# Patient Record
Sex: FEMALE | Race: WHITE | Employment: FULL TIME | URBAN - METROPOLITAN AREA
[De-identification: names, ages, dates, MRNs, and addresses within clinical notes are randomized per-mention and may not be internally consistent; named-entity substitution may affect disease eponyms.]

---

## 2018-06-26 ENCOUNTER — TELEPHONE (OUTPATIENT)
Dept: CARDIOLOGY CLINIC | Age: 63
End: 2018-06-26

## 2018-06-26 NOTE — TELEPHONE ENCOUNTER
Faxed records request to Dr. Kathy Padilla office.     Future Appointments  Date Time Provider Elaine Dodd   6/27/2018 2:40 PM Leif Murillo  [address]

## 2018-06-27 ENCOUNTER — OFFICE VISIT (OUTPATIENT)
Dept: CARDIOLOGY CLINIC | Age: 63
End: 2018-06-27

## 2018-06-27 VITALS
DIASTOLIC BLOOD PRESSURE: 80 MMHG | SYSTOLIC BLOOD PRESSURE: 122 MMHG | WEIGHT: 176 LBS | HEIGHT: 62 IN | OXYGEN SATURATION: 96 % | HEART RATE: 81 BPM | RESPIRATION RATE: 18 BRPM | BODY MASS INDEX: 32.39 KG/M2

## 2018-06-27 DIAGNOSIS — R00.0 TACHYCARDIA: ICD-10-CM

## 2018-06-27 DIAGNOSIS — R94.31 ABNORMAL EKG: Primary | ICD-10-CM

## 2018-06-27 DIAGNOSIS — R00.2 PALPITATIONS: ICD-10-CM

## 2018-06-27 DIAGNOSIS — I49.3 PVC (PREMATURE VENTRICULAR CONTRACTION): ICD-10-CM

## 2018-06-27 NOTE — MR AVS SNAPSHOT
[address] [facility] [address] [facility] 
[phone] Patient: Rafia Moody MRN: [id number] Visit Information Date & Time Provider Department Dept. Phone Encounter #  
 6/27/2018  2:40 PM Rosaline King MD [facility] Sheryle Pesa [phone] [id number] Follow-up Instructions Return in about 4 weeks (around 7/25/2018). Upcoming Health Maintenance Date Due Hepatitis C Screening 1955 Pneumococcal 19-64 Highest Risk (1 of 3 - PCV13) 11/21/1974 PAP AKA CERVICAL CYTOLOGY 11/21/1976 BREAST CANCER SCRN MAMMOGRAM 11/21/2005 ZOSTER VACCINE AGE 60> 9/21/2015 Influenza Age 5 to Adult 8/1/2018 COLONOSCOPY 6/18/2019 DTaP/Tdap/Td series (2 - Td) 2/2/2026 Allergies as of 6/27/2018  Review Complete On: 6/27/2018 By: Rosaline King MD  
  
 Severity Noted Reaction Type Reactions Bee Sting [Sting, Bee]  05/06/2010    Hives, Swelling Current Immunizations  Never Reviewed Name Date Tdap 2/2/2016 11:50 AM  
  
 Not reviewed this visit Vitals BP Pulse Resp Height(growth percentile) Weight(growth percentile) SpO2  
 122/80 (BP 1 Location: Right arm, BP Patient Position: Sitting) 81 18 5' 2\" (1.575 m) 176 lb (79.8 kg) 96% BMI OB Status Smoking Status 32.19 kg/m2 Premenopausal Never Smoker Vitals History BMI and BSA Data Body Mass Index Body Surface Area  
 32.19 kg/m 2 1.87 m 2 Preferred Pharmacy Pharmacy Name Phone [facility] [facility] [place] [phone] Your Updated Medication List  
  
   
This list is accurate as of 6/27/18  2:45 PM.  Always use your most recent med list.  
  
  
  
  
 acetaminophen-codeine 300-30 mg per tablet Commonly known as:  TYLENOL-CODEINE #3 Take 1-2 Tabs by mouth every four (4) hours as needed for Pain. Max Daily Amount: 12 Tabs. albuterol 90 mcg/actuation inhaler Commonly known as:  PROVENTIL HFA, VENTOLIN HFA, PROAIR HFA Take 2 Puffs by inhalation every six (6) hours as needed for Wheezing. betamethasone dipropionate 0.05 % topical cream  
Commonly known as:  Maria Luz Moravia Apply  to affected area two (2) times a day. tiotropium 18 mcg inhalation capsule Commonly known as:  Rekha Liriano Take 1 Cap by inhalation daily. Follow-up Instructions Return in about 4 weeks (around 7/25/2018). Introducing [facility]! Cherelle Miller introduces [employer] patient portal. Now you can access parts of your medical record, email your doctor's office, and request medication refills online. 1. In your internet browser, go to [email or web address]. [email or web address] 2. Click on the First Time User? Click Here link in the Sign In box. You will see the New Member Sign Up page. 3. Enter your [employer] Access Code exactly as it appears below. You will not need to use this code after youve completed the sign-up process. If you do not sign up before the expiration date, you must request a new code. · [employer] Access Code: [email or web address] Expires: 9/25/2018  2:45 PM 
 
4. Enter the last four digits of your Social Security Number (xxxx) and Date of Birth (mm/dd/yyyy) as indicated and click Submit. You will be taken to the next sign-up page. 5. Create a [employer] ID. This will be your [employer] login ID and cannot be changed, so think of one that is secure and easy to remember. 6. Create a [employer] password. You can change your password at any time. 7. Enter your Password Reset Question and Answer. This can be used at a later time if you forget your password. 8. Enter your e-mail address. You will receive e-mail notification when new information is available in [address] 9. Click Sign Up. You can now view and download portions of your medical record.  
10. Click the Download Summary menu link to download a portable copy of your medical information. If you have questions, please visit the Frequently Asked Questions section of the [employer] website. Remember, [employer] is NOT to be used for urgent needs. For medical emergencies, dial 911. Now available from your iPhone and Android! Please provide this summary of care documentation to your next provider. Your primary care clinician is listed as Aniyah Petty. If you have any questions after today's visit, please call [phone].

## 2018-06-27 NOTE — PROGRESS NOTES
HISTORY OF PRESENT ILLNESS  Abad Nickerson is a 58 y.o. female     SUMMARY:   Problem List  Date Reviewed: 6/27/2018          Codes Class Noted    Abnormal EKG ICD-10-CM: R94.31  ICD-9-CM: 794.31  6/27/2018        Skin cancer ICD-10-CM: C44.90  ICD-9-CM: 173.90  5/12/2010        Incontinence ICD-10-CM: R32  ICD-9-CM: 788.30  5/12/2010        Hyperlipidemia ICD-10-CM: E78.5  ICD-9-CM: 272.4  5/12/2010        Psoriasis ICD-10-CM: L40.9  ICD-9-CM: 696.1  5/6/2010        Migraine ICD-10-CM: Y62.305  ICD-9-CM: 346.90  5/6/2010        Rosacea ICD-10-CM: L71.9  ICD-9-CM: 695.3  5/6/2010    Overview Signed 5/6/2010  4:01 PM by Baldomero Ganser A Helsley Malar rash             Skin cancer ICD-10-CM: C44.90  ICD-9-CM: 173.90  5/6/2010              Current Outpatient Prescriptions on File Prior to Visit   Medication Sig    albuterol (PROVENTIL HFA, VENTOLIN HFA) 90 mcg/Actuation inhaler Take 2 Puffs by inhalation every six (6) hours as needed for Wheezing.  betamethasone dipropionate (DIPROSONE) 0.05 % topical cream Apply  to affected area two (2) times a day.  tiotropium (SPIRIVA) 18 mcg inhalation capsule Take 1 Cap by inhalation daily.  acetaminophen-codeine (TYLENOL-CODEINE #3) 300-30 mg per tablet Take 1-2 Tabs by mouth every four (4) hours as needed for Pain. Max Daily Amount: 12 Tabs. No current facility-administered medications on file prior to visit. CARDIOLOGY STUDIES TO DATE:  none  Chief Complaint   Patient presents with    Abnormal EKG     HPI :  Ms. Sandor Brown is a 58year-old referred by her primary care physician for evaluation of PVCs. Several months ago, she began to notice skipped beats, mostly when things were quiet in the evening, but not much at all during the day when she was up moving about. Interestingly, she has had a history of episodic tachycardia occurring every few months for years and years, which has never bothered her in any way.   She ended up seeing her primary care physician, who got a bunch of blood work, including normal CBC, normal thyroid, normal comprehensive metabolic, lipid profile where she had an HDL of 49 and LDL of 95. She also got an EKG, which showed sinus rhythm and frequent PVCs and an incomplete right bundle-branch block. There is no history of hypertension or diabetes. She has never smoked. Family history is negative for premature coronary disease. She drinks no more than two caffeinated beverages per day and rarely drinks alcohol. She does sleep poorly and has some generalized arthritis, occasional heartburn and wheezing with her asthma. She is very active and exercises intermittently, a combination of walking and recumbent bike with no symptoms suggestive of angina or heart failure. CARDIAC ROS:   negative for chest pain, dyspnea, syncope, orthopnea, paroxysmal nocturnal dyspnea, exertional chest pressure/discomfort, claudication, lower extremity edema    Family History   Problem Relation Age of Onset    No Known Problems Mother     Colon Cancer Father     Heart Disease Neg Hx        Past Medical History:   Diagnosis Date    Migraine 5/6/2010    Psoriasis 5/6/2010    Rosacea 5/6/2010    Skin cancer 5/6/2010       GENERAL ROS:  A comprehensive review of systems was negative except for that written in the HPI.     Visit Vitals    /80 (BP 1 Location: Right arm, BP Patient Position: Sitting)    Pulse 81    Resp 18    Ht 5' 2\" (1.575 m)    Wt 176 lb (79.8 kg)    SpO2 96%    BMI 32.19 kg/m2       Wt Readings from Last 3 Encounters:   06/27/18 176 lb (79.8 kg)   02/02/16 170 lb (77.1 kg)   05/12/10 166 lb (75.3 kg)            BP Readings from Last 3 Encounters:   06/27/18 122/80   02/02/16 150/75   05/12/10 108/70       PHYSICAL EXAM  General appearance: alert, cooperative, no distress, appears stated age  Neurologic: Alert and oriented X 3  Neck: supple, symmetrical, trachea midline, no adenopathy, no carotid bruit and no JVD  Lungs: clear to auscultation bilaterally  Heart: regular rate and rhythm, S1, S2 normal, no murmur, click, rub or gallop  Abdomen: soft, non-tender. Bowel sounds normal. No masses,  no organomegaly  Extremities: extremities normal, atraumatic, no cyanosis or edema  Pulses: 2+ and symmetric    Lab Results   Component Value Date/Time    Cholesterol, total 162 05/12/2010 10:51 AM    HDL Cholesterol 53 05/12/2010 10:51 AM    LDL, calculated 89 05/12/2010 10:51 AM    Triglyceride 100 05/12/2010 10:51 AM     ASSESSMENT  Ms. Iris Blanca has no real cardiac risk factors and now has some symptomatic PVCs. She may have had these for some time and just now become more aware of them based on her history. The tachycardia is longstanding and not bothering her, so I do not think either of these need any particular evaluation, other than getting an echocardiogram to make sure there is no structural heart issues. I told her going forward should she have more symptoms or more bothersome symptoms we could provide her with an event monitor or do a trial of low dose beta-blocker. current treatment plan is effective, no change in therapy  lab results and schedule of future lab studies reviewed with patient  reviewed diet, exercise and weight control    Encounter Diagnoses   Name Primary?  Abnormal EKG Yes    Palpitations     PVC (premature ventricular contraction)     Tachycardia      Orders Placed This Encounter    2D ECHO COMPLETE ADULT (TTE) W OR WO CONTR       Follow-up Disposition:  Return in about 4 weeks (around 7/25/2018).     Estiven Rosado MD  6/27/2018

## 2018-07-02 ENCOUNTER — TELEPHONE (OUTPATIENT)
Dept: CARDIOLOGY CLINIC | Age: 63
End: 2018-07-02

## 2018-07-02 ENCOUNTER — CLINICAL SUPPORT (OUTPATIENT)
Dept: CARDIOLOGY CLINIC | Age: 63
End: 2018-07-02

## 2018-07-02 DIAGNOSIS — R94.31 ABNORMAL EKG: ICD-10-CM

## 2018-07-02 DIAGNOSIS — I36.1 TRICUSPID VALVE INCOMPETENCE, NON-RHEUMATIC: ICD-10-CM

## 2018-07-02 DIAGNOSIS — I34.0 NONRHEUMATIC MITRAL (VALVE) INSUFFICIENCY: Primary | ICD-10-CM

## 2018-07-02 NOTE — PROGRESS NOTES
Heart muscle is strong. Couple of very mildly leaky valves, not a problem or cause for skipped beats and does not require any further testing.  Looks great

## 2018-07-02 NOTE — TELEPHONE ENCOUNTER
----- Message from Eh Padilla MD sent at 7/2/2018  4:47 PM EDT -----  Heart muscle is strong. Couple of very mildly leaky valves, not a problem or cause for skipped beats and does not require any further testing. Looks great      Called patient. Verified patient's identity with two identifiers. Notified patient of results and Dr. Hanh Pineda message. Patient verbalizes understanding and denies further questions or concerns.

## 2021-05-24 ENCOUNTER — TRANSCRIBE ORDER (OUTPATIENT)
Dept: REGISTRATION | Age: 66
End: 2021-05-24

## 2021-05-24 ENCOUNTER — HOSPITAL ENCOUNTER (OUTPATIENT)
Dept: PREADMISSION TESTING | Age: 66
Discharge: HOME | End: 2021-05-24
Payer: COMMERCIAL

## 2021-05-24 DIAGNOSIS — Z01.812 PRE-PROCEDURE LAB EXAM: Primary | ICD-10-CM

## 2021-05-24 DIAGNOSIS — Z01.812 PRE-PROCEDURE LAB EXAM: ICD-10-CM

## 2021-05-24 PROCEDURE — U0003 INFECTIOUS AGENT DETECTION BY NUCLEIC ACID (DNA OR RNA); SEVERE ACUTE RESPIRATORY SYNDROME CORONAVIRUS 2 (SARS-COV-2) (CORONAVIRUS DISEASE [COVID-19]), AMPLIFIED PROBE TECHNIQUE, MAKING USE OF HIGH THROUGHPUT TECHNOLOGIES AS DESCRIBED BY CMS-2020-01-R: HCPCS

## 2021-05-25 LAB — SARS-COV-2, COV2NT: NOT DETECTED

## 2021-05-28 ENCOUNTER — ANESTHESIA EVENT (OUTPATIENT)
Dept: ENDOSCOPY | Age: 66
End: 2021-05-28
Payer: COMMERCIAL

## 2021-05-28 ENCOUNTER — HOSPITAL ENCOUNTER (OUTPATIENT)
Age: 66
Setting detail: OUTPATIENT SURGERY
Discharge: HOME | End: 2021-05-28
Attending: SPECIALIST | Admitting: SPECIALIST
Payer: COMMERCIAL

## 2021-05-28 ENCOUNTER — ANESTHESIA (OUTPATIENT)
Dept: ENDOSCOPY | Age: 66
End: 2021-05-28
Payer: COMMERCIAL

## 2021-05-28 VITALS
RESPIRATION RATE: 20 BRPM | OXYGEN SATURATION: 97 % | HEIGHT: 62 IN | SYSTOLIC BLOOD PRESSURE: 114 MMHG | WEIGHT: 175 LBS | BODY MASS INDEX: 32.2 KG/M2 | TEMPERATURE: 98.4 F | HEART RATE: 78 BPM | DIASTOLIC BLOOD PRESSURE: 80 MMHG

## 2021-05-28 PROCEDURE — 74011250636 HC RX REV CODE- 250/636: Performed by: SPECIALIST

## 2021-05-28 PROCEDURE — 77030040684 HC NDL ENDOSC NEEDLEMASTR OCOA -B: Performed by: SPECIALIST

## 2021-05-28 PROCEDURE — 77030013992 HC SNR POLYP ENDOSC BSC -B: Performed by: SPECIALIST

## 2021-05-28 PROCEDURE — 74011250637 HC RX REV CODE- 250/637: Performed by: SPECIALIST

## 2021-05-28 PROCEDURE — 2709999900 HC NON-CHARGEABLE SUPPLY: Performed by: SPECIALIST

## 2021-05-28 PROCEDURE — C1713 ANCHOR/SCREW BN/BN,TIS/BN: HCPCS | Performed by: SPECIALIST

## 2021-05-28 PROCEDURE — 76040000007: Performed by: SPECIALIST

## 2021-05-28 PROCEDURE — 88305 TISSUE EXAM BY PATHOLOGIST: CPT

## 2021-05-28 PROCEDURE — 76060000032 HC ANESTHESIA 0.5 TO 1 HR: Performed by: SPECIALIST

## 2021-05-28 RX ORDER — SODIUM CHLORIDE 9 MG/ML
50 INJECTION, SOLUTION INTRAVENOUS CONTINUOUS
Status: DISCONTINUED | OUTPATIENT
Start: 2021-05-28 | End: 2021-05-28 | Stop reason: HOSPADM

## 2021-05-28 RX ORDER — DEXTROMETHORPHAN/PSEUDOEPHED 2.5-7.5/.8
1.2 DROPS ORAL
Status: DISCONTINUED | OUTPATIENT
Start: 2021-05-28 | End: 2021-05-28 | Stop reason: HOSPADM

## 2021-05-28 RX ORDER — EPINEPHRINE 0.1 MG/ML
1 INJECTION INTRACARDIAC; INTRAVENOUS
Status: DISCONTINUED | OUTPATIENT
Start: 2021-05-28 | End: 2021-05-28 | Stop reason: HOSPADM

## 2021-05-28 RX ORDER — ATROPINE SULFATE 0.1 MG/ML
0.5 INJECTION INTRAVENOUS
Status: DISCONTINUED | OUTPATIENT
Start: 2021-05-28 | End: 2021-05-28 | Stop reason: HOSPADM

## 2021-05-28 RX ORDER — FLUMAZENIL 0.1 MG/ML
0.2 INJECTION INTRAVENOUS
Status: DISCONTINUED | OUTPATIENT
Start: 2021-05-28 | End: 2021-05-28 | Stop reason: HOSPADM

## 2021-05-28 RX ORDER — LIDOCAINE HYDROCHLORIDE 20 MG/ML
INJECTION, SOLUTION EPIDURAL; INFILTRATION; INTRACAUDAL; PERINEURAL AS NEEDED
Status: SHIPPED | OUTPATIENT
Start: 2021-05-28

## 2021-05-28 RX ORDER — NALOXONE HYDROCHLORIDE 0.4 MG/ML
0.4 INJECTION, SOLUTION INTRAMUSCULAR; INTRAVENOUS; SUBCUTANEOUS
Status: DISCONTINUED | OUTPATIENT
Start: 2021-05-28 | End: 2021-05-28 | Stop reason: HOSPADM

## 2021-05-28 RX ORDER — SODIUM CHLORIDE 0.9 % (FLUSH) 0.9 %
5-40 SYRINGE (ML) INJECTION AS NEEDED
Status: DISCONTINUED | OUTPATIENT
Start: 2021-05-28 | End: 2021-05-28 | Stop reason: HOSPADM

## 2021-05-28 RX ORDER — SODIUM CHLORIDE 0.9 % (FLUSH) 0.9 %
5-40 SYRINGE (ML) INJECTION EVERY 8 HOURS
Status: DISCONTINUED | OUTPATIENT
Start: 2021-05-28 | End: 2021-05-28 | Stop reason: HOSPADM

## 2021-05-28 RX ORDER — PROPOFOL 10 MG/ML
INJECTION, EMULSION INTRAVENOUS AS NEEDED
Status: SHIPPED | OUTPATIENT
Start: 2021-05-28

## 2021-05-28 RX ORDER — SODIUM CHLORIDE 9 MG/ML
INJECTION, SOLUTION INTRAVENOUS
Status: SHIPPED | OUTPATIENT
Start: 2021-05-28

## 2021-05-28 RX ADMIN — PROPOFOL 30 MG: 10 INJECTION, EMULSION INTRAVENOUS at 12:57

## 2021-05-28 RX ADMIN — PROPOFOL 50 MG: 10 INJECTION, EMULSION INTRAVENOUS at 13:14

## 2021-05-28 RX ADMIN — PROPOFOL 30 MG: 10 INJECTION, EMULSION INTRAVENOUS at 13:05

## 2021-05-28 RX ADMIN — PROPOFOL 20 MG: 10 INJECTION, EMULSION INTRAVENOUS at 13:00

## 2021-05-28 RX ADMIN — LIDOCAINE HYDROCHLORIDE 50 MG: 20 INJECTION, SOLUTION EPIDURAL; INFILTRATION; INTRACAUDAL; PERINEURAL at 12:56

## 2021-05-28 RX ADMIN — SODIUM CHLORIDE: 9 INJECTION, SOLUTION INTRAVENOUS at 13:24

## 2021-05-28 RX ADMIN — PROPOFOL 20 MG: 10 INJECTION, EMULSION INTRAVENOUS at 13:10

## 2021-05-28 RX ADMIN — PROPOFOL 50 MG: 10 INJECTION, EMULSION INTRAVENOUS at 13:20

## 2021-05-28 RX ADMIN — PROPOFOL 20 MG: 10 INJECTION, EMULSION INTRAVENOUS at 12:58

## 2021-05-28 RX ADMIN — PROPOFOL 30 MG: 10 INJECTION, EMULSION INTRAVENOUS at 13:02

## 2021-05-28 RX ADMIN — PROPOFOL 50 MG: 10 INJECTION, EMULSION INTRAVENOUS at 13:08

## 2021-05-28 RX ADMIN — SODIUM CHLORIDE: 9 INJECTION, SOLUTION INTRAVENOUS at 12:22

## 2021-05-28 NOTE — PROCEDURES
[address]  [address] [facility]                 Colonoscopy Procedure Note    Indications:   See Preoperative Diagnosis above  Referring Physician: Roger Blackman MD  Anesthesia/Sedation: MAC anesthesia Propofol  Endoscopist:  Dr. Uriah Ruelas  Assistant:  Endoscopy Technician-1: Rosa Andersen IV  Endoscopy RN-1: Sandra Koehler RN  Preoperative diagnosis: FAMILY HISTORY OF COLON CANCER  Postoperative diagnosis: 1)Colon Polyps    Procedure in Detail:  Informed consent was obtained for the procedure, including sedation. Risks of perforation, hemorrhage, adverse drug reaction, and aspiration were discussed. The patient was placed in the left lateral decubitus position. Based on the pre-procedure assessment, including review of the patient's medical history, medications, allergies, and review of systems, she had been deemed to be an appropriate candidate for moderate sedation; she was therefore sedated with the medications listed above. The patient was monitored continuously with ECG tracing, pulse oximetry, blood pressure monitoring, and direct observations. A rectal examination was performed. The [id number] was inserted into the rectum and advanced under direct vision to the cecum, which was identified by the ileocecal valve and appendiceal orifice. The quality of the colonic preparation was good. A careful inspection was made as the colonoscope was withdrawn, including a retroflexed view of the rectum; findings and interventions are described below. Appropriate photodocumentation was obtained. Findings:  Rectum: normal  Sigmoid: moderate diverticulosis;   Descending Colon: moderate diverticulosis; 10 mm sessile polyp removed with hot snare after submucosal injection of 4 ml of O'Rise  Transverse Colon: 8 mm polyp removed with hot snare  Ascending Colon: normal  Cecum: normal      Specimens:    Colon polyps    EBL: None    Complications: None; patient tolerated the procedure well. Recommendations:     - Await pathology. - If adenoma is present, repeat colonoscopy in 3 years. High fiber diet.         Signed By: Otilia Vines MD                        May 28, 2021

## 2021-05-28 NOTE — ANESTHESIA PREPROCEDURE EVALUATION
Relevant Problems   No relevant active problems       Anesthetic History   No history of anesthetic complications            Review of Systems / Medical History  Patient summary reviewed, nursing notes reviewed and pertinent labs reviewed    Pulmonary  Within defined limits                 Neuro/Psych   Within defined limits           Cardiovascular              Hyperlipidemia    Exercise tolerance: >4 METS     GI/Hepatic/Renal  Within defined limits              Endo/Other  Within defined limits      Cancer     Other Findings              Physical Exam    Airway  Mallampati: II  TM Distance: > 6 cm  Neck ROM: normal range of motion   Mouth opening: Normal     Cardiovascular  Regular rate and rhythm,  S1 and S2 normal,  no murmur, click, rub, or gallop             Dental  No notable dental hx       Pulmonary  Breath sounds clear to auscultation               Abdominal  GI exam deferred       Other Findings            Anesthetic Plan    ASA: 2  Anesthesia type: MAC          Induction: Intravenous  Anesthetic plan and risks discussed with: Patient

## 2021-05-28 NOTE — H&P
Colonoscopy History and Physical      The patient was seen and examined. Date of last colonoscopy: 2014, Polyps  Yes      The airway was assessed and documented. The problem list, past medical history, and medications were reviewed. Patient Active Problem List   Diagnosis Code    Psoriasis L40.9    Migraine G43.909    Rosacea L71.9    Skin cancer C44.90    Skin cancer C44.90    Incontinence R32    Hyperlipidemia E78.5    Abnormal EKG R94.31     Social History     Socioeconomic History    Marital status:      Spouse name: Not on file    Number of children: Not on file    Years of education: Not on file    Highest education level: Not on file   Occupational History    Not on file   Tobacco Use    Smoking status: Never Smoker    Smokeless tobacco: Never Used   Substance and Sexual Activity    Alcohol use: Yes     Comment: occassional    Drug use: Yes     Types: OTC, Prescription    Sexual activity: Not on file   Other Topics Concern    Not on file   Social History Narrative    Not on file     Social Determinants of Health     Financial Resource Strain:     Difficulty of Paying Living Expenses:    Food Insecurity:     Worried About Running Out of Food in the Last Year:     Ran Out of Food in the Last Year:    Transportation Needs:     Lack of Transportation (Medical):      Lack of Transportation (Non-Medical):    Physical Activity:     Days of Exercise per Week:     Minutes of Exercise per Session:    Stress:     Feeling of Stress :    Social Connections:     Frequency of Communication with Friends and Family:     Frequency of Social Gatherings with Friends and Family:     Attends Jehovah's witness Services:     Active Member of Clubs or Organizations:     Attends Club or Organization Meetings:     Marital Status:    Intimate Partner Violence:     Fear of Current or Ex-Partner:     Emotionally Abused:     Physically Abused:     Sexually Abused:      Past Medical History: Diagnosis Date    Migraine 5/6/2010    Psoriasis 5/6/2010    Rosacea 5/6/2010    Skin cancer 5/6/2010         Prior to Admission Medications   Prescriptions Last Dose Informant Patient Reported? Taking?   acetaminophen-codeine (TYLENOL-CODEINE #3) 300-30 mg per tablet Not Taking at Unknown time  No No   Sig: Take 1-2 Tabs by mouth every four (4) hours as needed for Pain. Max Daily Amount: 12 Tabs. Patient not taking: Reported on 5/28/2021   albuterol (PROVENTIL HFA, VENTOLIN HFA) 90 mcg/Actuation inhaler 5/21/2021 at Unknown time  No Yes   Sig: Take 2 Puffs by inhalation every six (6) hours as needed for Wheezing. betamethasone dipropionate (DIPROSONE) 0.05 % topical cream 5/27/2021 at Unknown time  Yes Yes   Sig: Apply  to affected area two (2) times a day. tiotropium (SPIRIVA) 18 mcg inhalation capsule 5/21/2021 at Unknown time  Yes Yes   Sig: Take 1 Cap by inhalation daily. Facility-Administered Medications: None       The patient was seen and examined in the endoscopy suite. The airway was assessed and documented. The problem list and medications were reviewed. Chief complaint, history of present illness, and review of systems and Past medical History are positive for: history of polyps    The heart, lungs and mental status were satisfactory for the administration of sedation and for the procedure. I discussed with the patient the objectives, risks, consequences and alternatives to the procedure. The patient was counseled at length about the risks of solomon Covid-19 in the breanna-operative and post-operative states including the recovery window of their procedure. The patient was made aware that solomon Covid-19 after a surgical procedure may worsen their prognosis for recovering from the virus and lend to a higher morbidity and or mortality risk. The patient was given the options of postponing their procedure. All of the risks, benefits, and alternatives were discussed.  The patient does  wish to proceed with the procedure.     Plan: Endoscopic procedure with sedation     Briana Latif MD   5/28/2021  12:56 PM

## 2021-05-28 NOTE — DISCHARGE INSTRUCTIONS
Iza Notice  [id number]  1955    COLON DISCHARGE INSTRUCTIONS  Discomfort:  Redness at IV site- apply warm compress to area; if redness or soreness persist- contact your physician  There may be a slight amount of blood passed from the rectum  Gaseous discomfort- walking, belching will help relieve any discomfort    DIET:   High fiber diet. - however -  remember your colon is empty and a heavy meal will produce gas. Avoid these foods:  vegetables, fried / greasy foods, carbonated drinks for today. You may not drink alcoholic beverages for at least 12 hours    MEDICATIONS:   Regarding Aspirin or Nonsteroidal medications, please see below. ACTIVITY:  You may resume your normal daily activities it is recommended that you spend the remainder of the day resting -  avoid any strenuous activity. You may not operate a vehicle for 12 hours  You may not engage in an occupation involving machinery or appliances for rest of today  Avoid making any critical decisions for at least 24 hour    CALL M.D. ANY SIGN OF:  Increasing pain, nausea, vomiting  Abdominal distension (swelling)  New increased bleeding (oral or rectal)  Fever (chills)  Pain in chest area  Bloody discharge from nose or mouth  Shortness of breath    You may not  take any Advil, Aspirin, Ibuprofen, Motrin, Aleve, or Goodys for 10 days, ONLY  Tylenol as needed for pain. Post procedure diagnosis: 1)Colon Polyps      Follow-up Instructions:   Call Dr. Toña Benitez  Results of procedure / biopsy in 10 days  Telephone #  [phone]      DISCHARGE SUMMARY from Nurse    The following personal items collected during your admission are returned to you:   Dental Appliance: Dental Appliances: None  Vision: Visual Aid: Glasses  Hearing Aid:    Jewelry:    Clothing:    Other Valuables:    Valuables sent to safe:      Learning About Coronavirus (COVID-19)  Coronavirus (COVID-19): Overview  What is coronavirus ([id number])?   The coronavirus disease (COVID-19) is caused by a virus. It is an illness that was first found in [place], [place], in December 2019. It has since spread worldwide. The virus can cause fever, cough, and trouble breathing. In severe cases, it can cause pneumonia and make it hard to breathe without help. It can cause death. Coronaviruses are a large group of viruses. They cause the common cold. They also cause more serious illnesses like Middle East respiratory syndrome (MERS) and severe acute respiratory syndrome (SARS). COVID-19 is caused by a novel coronavirus. That means it's a new type that has not been seen in people before. This virus spreads person-to-person through droplets from coughing and sneezing. It can also spread when you are close to someone who is infected. And it can spread when you touch something that has the virus on it, such as a doorknob or a tabletop. What can you do to protect yourself from coronavirus (COVID-19)? The best way to protect yourself from getting sick is to:  · Avoid areas where there is an outbreak. · Avoid contact with people who may be infected. · Wash your hands often with soap or alcohol-based hand sanitizers. · Avoid crowds and try to stay at least 6 feet away from other people. · Wash your hands often, especially after you cough or sneeze. Use soap and water, and scrub for at least 20 seconds. If soap and water aren't available, use an alcohol-based hand . · Avoid touching your mouth, nose, and eyes. What can you do to avoid spreading the virus to others? To help avoid spreading the virus to others:  · Cover your mouth with a tissue when you cough or sneeze. Then throw the tissue in the trash. · Use a disinfectant to clean things that you touch often. · Stay home if you are sick or have been exposed to the virus. Don't go to school, work, or public areas. And don't use public transportation. · If you are sick:  ? Leave your home only if you need to get medical care.  But call the doctor's office first so they know you're coming. And wear a face mask, if you have one.  ? If you have a face mask, wear it whenever you're around other people. It can help stop the spread of the virus when you cough or sneeze. ? Clean and disinfect your home every day. Use household  and disinfectant wipes or sprays. Take special care to clean things that you grab with your hands. These include doorknobs, remote controls, phones, and handles on your refrigerator and microwave. And don't forget countertops, tabletops, bathrooms, and computer keyboards. When to call for help  Call 911 anytime you think you may need emergency care. For example, call if:  · You have severe trouble breathing. (You can't talk at all.)  · You have constant chest pain or pressure. · You are severely dizzy or lightheaded. · You are confused or can't think clearly. · Your face and lips have a blue color. · You pass out (lose consciousness) or are very hard to wake up. Call your doctor now if you develop symptoms such as:  · Shortness of breath. · Fever. · Cough. If you need to get care, call ahead to the doctor's office for instructions before you go. Make sure you wear a face mask, if you have one, to prevent exposing other people to the virus. Where can you get the latest information? The following health organizations are tracking and studying this virus. Their websites contain the most up-to-date information. Romina Bond also learn what to do if you think you may have been exposed to the virus. · U.S. Centers for Disease Control and Prevention (CDC): The CDC provides updated news about the disease and travel advice. The website also tells you how to prevent the spread of infection. www.cdc.gov  · World Health Organization [facility]): WHO offers information about the virus outbreaks. WHO also has travel advice. www.who.int  Current as of: April 1, 2020               Content Version: 12.4  © 4895-3487 [employer].    Care instructions adapted under license by your healthcare professional. If you have questions about a medical condition or this instruction, always ask your healthcare professional. [place] [address] any warranty or liability for your use of this information.

## 2022-03-19 PROBLEM — R94.31 ABNORMAL EKG: Status: ACTIVE | Noted: 2018-06-27

## 2023-05-12 RX ORDER — BETAMETHASONE DIPROPIONATE 0.5 MG/G
CREAM TOPICAL 2 TIMES DAILY
COMMUNITY

## 2023-05-12 RX ORDER — ALBUTEROL SULFATE 90 UG/1
2 AEROSOL, METERED RESPIRATORY (INHALATION) EVERY 6 HOURS PRN
COMMUNITY
Start: 2010-06-04

## 2023-05-12 RX ORDER — ACETAMINOPHEN AND CODEINE PHOSPHATE 300; 30 MG/1; MG/1
1-2 TABLET ORAL EVERY 4 HOURS PRN
COMMUNITY
Start: 2016-02-02

## 2024-06-12 ENCOUNTER — HOSPITAL ENCOUNTER (OUTPATIENT)
Facility: HOSPITAL | Age: 69
Setting detail: OUTPATIENT SURGERY
Discharge: HOME | End: 2024-06-12
Attending: SPECIALIST | Admitting: SPECIALIST
Payer: COMMERCIAL

## 2024-06-12 ENCOUNTER — ANESTHESIA EVENT (OUTPATIENT)
Facility: HOSPITAL | Age: 69
End: 2024-06-12
Payer: COMMERCIAL

## 2024-06-12 ENCOUNTER — ANESTHESIA (OUTPATIENT)
Facility: HOSPITAL | Age: 69
End: 2024-06-12
Payer: COMMERCIAL

## 2024-06-12 VITALS
DIASTOLIC BLOOD PRESSURE: 62 MMHG | BODY MASS INDEX: 30.73 KG/M2 | WEIGHT: 167 LBS | OXYGEN SATURATION: 99 % | HEIGHT: 62 IN | TEMPERATURE: 97.5 F | SYSTOLIC BLOOD PRESSURE: 107 MMHG | HEART RATE: 71 BPM | RESPIRATION RATE: 13 BRPM

## 2024-06-12 PROCEDURE — 6360000002 HC RX W HCPCS

## 2024-06-12 PROCEDURE — 7100000010 HC PHASE II RECOVERY - FIRST 15 MIN: Performed by: SPECIALIST

## 2024-06-12 PROCEDURE — 7100000011 HC PHASE II RECOVERY - ADDTL 15 MIN: Performed by: SPECIALIST

## 2024-06-12 PROCEDURE — 2709999900 HC NON-CHARGEABLE SUPPLY: Performed by: SPECIALIST

## 2024-06-12 PROCEDURE — 3600007512: Performed by: SPECIALIST

## 2024-06-12 PROCEDURE — 3700000001 HC ADD 15 MINUTES (ANESTHESIA): Performed by: SPECIALIST

## 2024-06-12 PROCEDURE — 6370000000 HC RX 637 (ALT 250 FOR IP): Performed by: SPECIALIST

## 2024-06-12 PROCEDURE — 2500000003 HC RX 250 WO HCPCS

## 2024-06-12 PROCEDURE — 88305 TISSUE EXAM BY PATHOLOGIST: CPT

## 2024-06-12 PROCEDURE — 3600007502: Performed by: SPECIALIST

## 2024-06-12 PROCEDURE — 3700000000 HC ANESTHESIA ATTENDED CARE: Performed by: SPECIALIST

## 2024-06-12 PROCEDURE — 2580000003 HC RX 258

## 2024-06-12 RX ORDER — SODIUM CHLORIDE 9 MG/ML
INJECTION, SOLUTION INTRAVENOUS CONTINUOUS
Status: DISCONTINUED | OUTPATIENT
Start: 2024-06-12 | End: 2024-06-12 | Stop reason: HOSPADM

## 2024-06-12 RX ORDER — SODIUM CHLORIDE 0.9 % (FLUSH) 0.9 %
5-40 SYRINGE (ML) INJECTION PRN
Status: DISCONTINUED | OUTPATIENT
Start: 2024-06-12 | End: 2024-06-12 | Stop reason: HOSPADM

## 2024-06-12 RX ORDER — SODIUM CHLORIDE 9 MG/ML
25 INJECTION, SOLUTION INTRAVENOUS PRN
Status: DISCONTINUED | OUTPATIENT
Start: 2024-06-12 | End: 2024-06-12 | Stop reason: HOSPADM

## 2024-06-12 RX ORDER — LIDOCAINE HYDROCHLORIDE 20 MG/ML
INJECTION, SOLUTION EPIDURAL; INFILTRATION; INTRACAUDAL; PERINEURAL PRN
Status: DISCONTINUED | OUTPATIENT
Start: 2024-06-12 | End: 2024-06-12 | Stop reason: SDUPTHER

## 2024-06-12 RX ORDER — SODIUM CHLORIDE 0.9 % (FLUSH) 0.9 %
5-40 SYRINGE (ML) INJECTION EVERY 12 HOURS SCHEDULED
Status: DISCONTINUED | OUTPATIENT
Start: 2024-06-12 | End: 2024-06-12 | Stop reason: HOSPADM

## 2024-06-12 RX ORDER — SIMETHICONE 40MG/0.6ML
SUSPENSION, DROPS(FINAL DOSAGE FORM)(ML) ORAL PRN
Status: DISCONTINUED | OUTPATIENT
Start: 2024-06-12 | End: 2024-06-12 | Stop reason: ALTCHOICE

## 2024-06-12 RX ORDER — SODIUM CHLORIDE, SODIUM LACTATE, POTASSIUM CHLORIDE, CALCIUM CHLORIDE 600; 310; 30; 20 MG/100ML; MG/100ML; MG/100ML; MG/100ML
INJECTION, SOLUTION INTRAVENOUS CONTINUOUS PRN
Status: DISCONTINUED | OUTPATIENT
Start: 2024-06-12 | End: 2024-06-12 | Stop reason: SDUPTHER

## 2024-06-12 RX ADMIN — PROPOFOL 20 MG: 10 INJECTION, EMULSION INTRAVENOUS at 08:25

## 2024-06-12 RX ADMIN — PROPOFOL 20 MG: 10 INJECTION, EMULSION INTRAVENOUS at 08:19

## 2024-06-12 RX ADMIN — PROPOFOL 30 MG: 10 INJECTION, EMULSION INTRAVENOUS at 08:22

## 2024-06-12 RX ADMIN — LIDOCAINE HYDROCHLORIDE 50 MG: 20 INJECTION, SOLUTION EPIDURAL; INFILTRATION; INTRACAUDAL; PERINEURAL at 08:09

## 2024-06-12 RX ADMIN — PROPOFOL 20 MG: 10 INJECTION, EMULSION INTRAVENOUS at 08:11

## 2024-06-12 RX ADMIN — PROPOFOL 20 MG: 10 INJECTION, EMULSION INTRAVENOUS at 08:28

## 2024-06-12 RX ADMIN — PROPOFOL 50 MG: 10 INJECTION, EMULSION INTRAVENOUS at 08:09

## 2024-06-12 RX ADMIN — PROPOFOL 20 MG: 10 INJECTION, EMULSION INTRAVENOUS at 08:15

## 2024-06-12 RX ADMIN — SODIUM CHLORIDE, POTASSIUM CHLORIDE, SODIUM LACTATE AND CALCIUM CHLORIDE: 600; 310; 30; 20 INJECTION, SOLUTION INTRAVENOUS at 08:02

## 2024-06-12 RX ADMIN — PROPOFOL 20 MG: 10 INJECTION, EMULSION INTRAVENOUS at 08:13

## 2024-06-12 RX ADMIN — PROPOFOL 20 MG: 10 INJECTION, EMULSION INTRAVENOUS at 08:17

## 2024-06-12 NOTE — H&P
Pre-endoscopy H and P for Colonoscopy    The patient was seen and examined.Date of last colonoscopy: 2021, Polyps  Yes      The airway was assessed and documented.  The problem list, past medical history, and medications were reviewed.     Patient Active Problem List   Diagnosis    Psoriasis    Incontinence    Abnormal EKG    Rosacea    Migraine    Skin cancer    Hyperlipidemia     Social History     Socioeconomic History    Marital status:      Spouse name: Not on file    Number of children: Not on file    Years of education: Not on file    Highest education level: Not on file   Occupational History    Not on file   Tobacco Use    Smoking status: Never    Smokeless tobacco: Never   Substance and Sexual Activity    Alcohol use: Yes     Comment: rare    Drug use: Yes     Types: OTC, Prescription    Sexual activity: Not on file   Other Topics Concern    Not on file   Social History Narrative    Not on file     Social Determinants of Health     Financial Resource Strain: Not on file   Food Insecurity: Not on file   Transportation Needs: Not on file   Physical Activity: Not on file   Stress: Not on file   Social Connections: Not on file   Intimate Partner Violence: Not on file   Housing Stability: Not on file     Past Medical History:   Diagnosis Date    Asthma     Migraine 5/6/2010    Psoriasis 5/6/2010    Rosacea 5/6/2010    Skin cancer 5/6/2010     The patient has a family history of NA    Prior to Admission Medications   Prescriptions Last Dose Informant Patient Reported? Taking?   acetaminophen-codeine (TYLENOL #3) 300-30 MG per tablet Past Month  Yes No   Sig: Take 1-2 tablets by mouth every 4 hours as needed.   albuterol sulfate HFA (PROVENTIL;VENTOLIN;PROAIR) 108 (90 Base) MCG/ACT inhaler Past Month  Yes No   Sig: Inhale 2 puffs into the lungs every 6 hours as needed   betamethasone dipropionate 0.05 % cream Past Month  Yes No   Sig: Apply topically 2 times daily   tiotropium (SPIRIVA) 18 MCG inhalation

## 2024-06-12 NOTE — DISCHARGE INSTRUCTIONS
Rebecca Rojas  [id number]  1955    COLON DISCHARGE INSTRUCTIONS  Discomfort:  Redness at IV site- apply warm compress to area; if redness or soreness persist- contact your physician  There may be a slight amount of blood passed from the rectum  Gaseous discomfort- walking, belching will help relieve any discomfort    DIET:   High fiber diet.   - however -  remember your colon is empty and a heavy meal will produce gas.   Avoid these foods:  vegetables, fried / greasy foods, carbonated drinks for today.   You may not drink alcoholic beverages for at least 12 hours    MEDICATIONS:   Regarding Aspirin or Nonsteroidal medications, please see below.    ACTIVITY:  You may resume your normal daily activities it is recommended that you spend the remainder of the day resting -  avoid any strenuous activity.  You may not operate a vehicle for 12 hours  You may not engage in an occupation involving machinery or appliances for rest of today  Avoid making any critical decisions for at least 24 hour    CALL M.D.  ANY SIGN OF:  Increasing pain, nausea, vomiting  Abdominal distension (swelling)  New increased bleeding (oral or rectal)  Fever (chills)  Pain in chest area  Bloody discharge from nose or mouth  Shortness of breath    You may take Tylenol as needed for pain. You may  take any Advil, Aspirin, Ibuprofen, Motrin, Aleve, or Goody’s for 10 days,      Post procedure diagnosis: Diverticulosis, colon polyp  Post-procedure recommendations: await biopsy results    Follow-up Instructions:   Call Dr. Pollock  Results of procedure / biopsy in 10 days  Telephone #  [phone]

## 2024-06-12 NOTE — OP NOTE
patient tolerated the procedure well.    Recommendations:     - Await pathology.    - High fiber diet.      Signed By: Von Pollock MD                        June 12, 2024

## 2024-06-12 NOTE — PROGRESS NOTES

## 2024-06-12 NOTE — ANESTHESIA POSTPROCEDURE EVALUATION
Department of Anesthesiology  Postprocedure Note    Patient: Rebecca Rojas  MRN: [id number]  YOB: 1955  Date of evaluation: 6/12/2024    Procedure Summary       Date: 06/12/24 Room / Location: [facility] ENDO 03 / [facility] ENDOSCOPY    Anesthesia Start: 0802 Anesthesia Stop: 0829    Procedure: COLONOSCOPY DIAGNOSTIC (Lower GI Region) Diagnosis:       History of colon polyps      (History of colon polyps [Z86.010])    Surgeons: Von Pollock MD Responsible Provider: Ozzy Mtz MD    Anesthesia Type: MAC ASA Status: 2            Anesthesia Type: MAC    Melita Phase I: Melita Score: 10    Melita Phase II: Melita Score: 10    Anesthesia Post Evaluation    Patient location during evaluation: bedside  Nausea & Vomiting: no nausea  Cardiovascular status: blood pressure returned to baseline  Respiratory status: acceptable  Hydration status: euvolemic    No notable events documented.

## 2024-06-12 NOTE — ANESTHESIA PRE PROCEDURE
Department of Anesthesiology  Preprocedure Note       Name:  Rebecca Rojas   Age:  68 y.o.  :  1955                                          MRN:  [id number]         Date:  2024      Surgeon: Surgeon(s):  Von Pollock MD    Procedure: Procedure(s):  COLONOSCOPY DIAGNOSTIC    Medications prior to admission:   Prior to Admission medications    Medication Sig Start Date End Date Taking? Authorizing Provider   acetaminophen-codeine (TYLENOL #3) 300-30 MG per tablet Take 1-2 tablets by mouth every 4 hours as needed. 16   Automatic Reconciliation, Ar   albuterol sulfate HFA (PROVENTIL;VENTOLIN;PROAIR) 108 (90 Base) MCG/ACT inhaler Inhale 2 puffs into the lungs every 6 hours as needed 6/4/10   Automatic Reconciliation, Ar   betamethasone dipropionate 0.05 % cream Apply topically 2 times daily    Automatic Reconciliation, Ar       Current medications:    No current facility-administered medications for this encounter.       Allergies:    Allergies   Allergen Reactions   • Bee Venom Hives and Swelling       Problem List:    Patient Active Problem List   Diagnosis Code   • Psoriasis L40.9   • Incontinence R32   • Abnormal EKG R94.31   • Rosacea L71.9   • Migraine G43.909   • Skin cancer C44.90   • Hyperlipidemia E78.5       Past Medical History:        Diagnosis Date   • Asthma    • Migraine 2010   • Psoriasis 2010   • Rosacea 2010   • Skin cancer 2010       Past Surgical History:        Procedure Laterality Date   • COLONOSCOPY N/A 2021    COLONOSCOPY   :- performed by Von Pollock MD at [facility] ENDOSCOPY   • DENTAL SURGERY      Implant       Social History:    Social History     Tobacco Use   • Smoking status: Never   • Smokeless tobacco: Never   Substance Use Topics   • Alcohol use: Yes     Comment: rare                                Counseling given: Not Answered      Vital Signs (Current):   Vitals:    24 0738 24 0742   BP:  120/78   Pulse:  72   Resp:  18   Temp:  97.8

## (undated) DEVICE — SUPPLEMENT DIGESTIVE H2O SOL GI-EASE

## (undated) DEVICE — GEL SUBMUCOSAL LIFT AGNT -- ORISE

## (undated) DEVICE — REM POLYHESIVE ADULT PATIENT RETURN ELECTRODE: Brand: VALLEYLAB

## (undated) DEVICE — TRAP SURG QUAD PARABOLA SLOT DSGN SFTY SCRN TRAPEASE

## (undated) DEVICE — Device: Brand: SINGLE USE INJECTOR NM600/610

## (undated) DEVICE — SNARE POLYP SM AD W13MMXL240CM SHTH DIA2.4MM HEX STIFF

## (undated) DEVICE — SNARE ENDOSCP L240CM LOOP W27MM SHTH DIA2.4MM WRK CHN 2.8MM

## (undated) DEVICE — ELECTRODE PT RET AD L9FT HI MOIST COND ADH HYDRGEL CORDED

## (undated) DEVICE — SNARE ENDOSCP M L240CM W27MM SHTH DIA2.4MM CHN 2.8MM OVL